# Patient Record
Sex: MALE | Race: WHITE | Employment: UNEMPLOYED | URBAN - METROPOLITAN AREA
[De-identification: names, ages, dates, MRNs, and addresses within clinical notes are randomized per-mention and may not be internally consistent; named-entity substitution may affect disease eponyms.]

---

## 2017-06-09 ENCOUNTER — TRANSFERRED RECORDS (OUTPATIENT)
Dept: HEALTH INFORMATION MANAGEMENT | Facility: CLINIC | Age: 37
End: 2017-06-09

## 2017-06-12 ENCOUNTER — TRANSFERRED RECORDS (OUTPATIENT)
Dept: HEALTH INFORMATION MANAGEMENT | Facility: CLINIC | Age: 37
End: 2017-06-12

## 2017-06-14 ENCOUNTER — TRANSFERRED RECORDS (OUTPATIENT)
Dept: HEALTH INFORMATION MANAGEMENT | Facility: CLINIC | Age: 37
End: 2017-06-14

## 2017-06-23 ENCOUNTER — OFFICE VISIT (OUTPATIENT)
Dept: OTOLARYNGOLOGY | Facility: CLINIC | Age: 37
End: 2017-06-23

## 2017-06-23 VITALS — WEIGHT: 183 LBS | HEIGHT: 73 IN | BODY MASS INDEX: 24.25 KG/M2

## 2017-06-23 DIAGNOSIS — R42 DIZZINESS: Primary | ICD-10-CM

## 2017-06-23 ASSESSMENT — PAIN SCALES - GENERAL: PAINLEVEL: MILD PAIN (2)

## 2017-06-23 NOTE — NURSING NOTE
Chief Complaint   Patient presents with     RECHECK     check left ear     Nandini Guzman Medical Assistant

## 2017-06-23 NOTE — LETTER
6/23/2017       RE: Baljeet Acosta  [address]  [place] [place] [address]     Dear Colleague,    Thank you for referring your patient, Baljeet Acosta, to the [facility]AT at [facility]. Please see a copy of my visit note below.    We had the pleasure of seeing Mr. Acosta at the [facility] for follow up regarding his dizziness.        HISTORY OF PRESENT ILLNESS:  Mr. Acosta is a 37-year-old gentleman with a history of left-sided Meniere's disease.  He has undergone multiple treatments including a left gentamicin injection as well endolymphatic sac decompression.  He also underwent a left vestibular nerve section here at the [facility] in 2010.  He had continued chronic dizziness and most recently was treated at [facility] in May and June of 2015 for a revision left vestibular section through middle fossa approach which was complicated by a CSF leak which was repaired shortly afterwards.  Since that most recent surgery the patient reports that he had resolution of his chronic dizziness and no further vertigo episodes.  However, more recently starting in May he began having to have new symptoms again.  The patient reports that he increased his activity level after his second vestibular nerve section.  He continued to have some occipital headaches which they initially thought was related to a spinal fluid leak and it resulted in two separate blood patches.  He continues to have this; however, this is not terribly bothersome to him.        Starting in early May, the patient reports having onset of a full vertigo attach including intense incapacitating vertigo with nausea and vomiting which lasted approximately four hours.  This resolved after sleeping, taking valium, Zofran and meclizine.  His balance function was then [id number] to normal.  He again had symptoms several weeks later while he was hiking.  He had  an odd sensation in his left ear associated with nausea and balance for approximately 30 minutes and then a week after that he had another episodes of intense vertigo that was associated with left aural fullness and a fluid sensation in that ear.  After these episodes he was seen by his primary care physician and was diagnosed with middle ear effusion on the left side and treated with amoxicillin.  In June he again had another intense vertigo attack with nausea and vomiting.  Now fluid and fullness sensation in both of his ears.  He was treated in the Emergency Department with meclizine, IV fluids and his symptoms resolved after five hours.  He again followed up with his primary care physician and was diagnosed bilateral middle ear effusion.  Due to the persistence in recurrence of his vertigo episodes he sought care and follow up in the [facility].  He had vestibular testing that was performed incidentally prior to his vestibular testing.  He had been taking his meclizine and valium at least within 12 hours of his testing.  His testing demonstrated no response on the ice calorics.  He did have normal function on the right hand side and the further findings were suggested of a left peripheral vestibular assistant hypofunction that appeared compensated functionally.  He also had VEMPS that were performed that were no response in the left ear and normal on the right.  He followed up with Dr. Doyle to [facility] and he had suggested that potentially he may have some Meniere's disease developing on the right hand side.  Also, recommended investigation into relating these episodes to panic attacks.         The patient has subsequently seen his psychologist and there is ruled out panic attach as a cause for some of his symptoms.  He again had another episode of vertigo only five days ago and has a constant dripping sensation in both of his ears; worse on the left compared to the right.  He denies any photophobia or  associated severe headaches.  He continues to have that occipital discomfort since his middle fossa approach from a year ago.  In between these episodes again he reports that his balances are relatively normal.  He feels fatigued, but otherwise has no difficulty with his other activities.  He has not having any issues with anxiety or having panic disorders more recently.        In regards to his function of hearing on the left hand side he does have a decline which has been present for quite some time; however, he can continue to use the telephone and understand speech perception on the left.  He is interested in discussing the results of his testing and investigating if he needs to have additional testing performed some time.       REVIEW OF SYSTEM: A 12-point review of systems was performed.   This positive includes unexplained fatigue, dizzy spells, headache, hearing loss, ear pain, tinnitus primarily in the left ear, sore joints, back pain and neck pain.        PHYSICAL EXAMINATION:  In general the patient is well appearing.  He is communicating appropriately.  His face is symmetric at rest with function.  He is a House-Brackmann I out of VI bilaterally.  His external ears are normal.  The external auditory canals are fine.  Tympanic membranes are Intact.  There is no retraction or middle ear effusion noted.  The middle ear appears well aerated bilaterally.  Oral cavity and oropharynx reveals no oral mucosal lesions.  Palate does have some post surgical changes from a prior uvulopalatoplasty.  His palate elevates symmetrically.  Tongue is midline and mobile.  His extraocular movements are intact.  He has gaze-evoked nystagmus.        AUDIOGRAM:  Audiogram was obtained at the [facility] which was reviewed.  This demonstrates normal pure tone thresholds on the right hand side and the left he has a moderate sensorineural hearing loss that appears relatively stable.  His word recognition score on the right is 100%  and on the left is 90%.  His pure tone average on the left is 50 dB and on the right is 8 dB.        IMPRESSION AND PLAN:  Mr. Acosta is a 37-year-old gentleman with a history of a left Meniere's disease.  He has had multiple procedures to help with his vertigo associated with the Meniere's including multiple gentamicin injections as well as revision vestibular nerve section.  He continues to have some new episodes of vertigo.  We discussed potential etiology for this; one could be residual function on the left hand side which may have been masked with some of his VNG findings due to his valium associated with the time of the testing.  We discussed repeating these tests which he was interesting with proceeding with this and in fact requested a repeat of his vestibular testing.  We will order him to have a VNG with ice calorics on the left hand side.  We then discussed the possibility of proceeding with a labyrinthectomy should he have some residual vestibular function on that left hand side.  He is not inclined to proceed with this nor are we recommending this procedure at all at this point.  If he does have somewhat functioning hearing per his report and it is not clear if he has any remaining function or if this is a new onset of right-sided Meniere's disease.  Reviewed with him this could be early signs of acting up on his right ear and we need to continue to monitor that to see what progresses from that standpoint.  He will have the vestibular testing done and we will contact him with the results of that.  All his questions were answered and he will contact us with any further concerns.        Eli Bello MD    Neurotology Fellow      EDWEY/carrie ADAME was present for the entire procedure between opening and closing.    aKmran Lau  Date of Service (when I saw the patient): (6/23/17)

## 2017-06-23 NOTE — PROGRESS NOTES
We had the pleasure of seeing Mr. Acosta at the [facility] for follow up regarding his dizziness.        HISTORY OF PRESENT ILLNESS:  Mr. Acosta is a 37-year-old gentleman with a history of left-sided Meniere's disease.  He has undergone multiple treatments including a left gentamicin injection as well endolymphatic sac decompression.  He also underwent a left vestibular nerve section here at the [facility] in 2010.  He had continued chronic dizziness and most recently was treated at [facility] in May and June of 2015 for a revision left vestibular section through middle fossa approach which was complicated by a CSF leak which was repaired shortly afterwards.  Since that most recent surgery the patient reports that he had resolution of his chronic dizziness and no further vertigo episodes.  However, more recently starting in May he began having to have new symptoms again.  The patient reports that he increased his activity level after his second vestibular nerve section.  He continued to have some occipital headaches which they initially thought was related to a spinal fluid leak and it resulted in two separate blood patches.  He continues to have this; however, this is not terribly bothersome to him.        Starting in early May, the patient reports having onset of a full vertigo attach including intense incapacitating vertigo with nausea and vomiting which lasted approximately four hours.  This resolved after sleeping, taking valium, Zofran and meclizine.  His balance function was then [id number] to normal.  He again had symptoms several weeks later while he was hiking.  He had an odd sensation in his left ear associated with nausea and balance for approximately 30 minutes and then a week after that he had another episodes of intense vertigo that was associated with left aural fullness and a fluid sensation in that ear.  After these episodes he was seen by his primary  care physician and was diagnosed with middle ear effusion on the left side and treated with amoxicillin.  In June he again had another intense vertigo attack with nausea and vomiting.  Now fluid and fullness sensation in both of his ears.  He was treated in the Emergency Department with meclizine, IV fluids and his symptoms resolved after five hours.  He again followed up with his primary care physician and was diagnosed bilateral middle ear effusion.  Due to the persistence in recurrence of his vertigo episodes he sought care and follow up in the [facility].  He had vestibular testing that was performed incidentally prior to his vestibular testing.  He had been taking his meclizine and valium at least within 12 hours of his testing.  His testing demonstrated no response on the ice calorics.  He did have normal function on the right hand side and the further findings were suggested of a left peripheral vestibular assistant hypofunction that appeared compensated functionally.  He also had VEMPS that were performed that were no response in the left ear and normal on the right.  He followed up with Dr. Doyle to [facility] and he had suggested that potentially he may have some Meniere's disease developing on the right hand side.  Also, recommended investigation into relating these episodes to panic attacks.         The patient has subsequently seen his psychologist and there is ruled out panic attach as a cause for some of his symptoms.  He again had another episode of vertigo only five days ago and has a constant dripping sensation in both of his ears; worse on the left compared to the right.  He denies any photophobia or associated severe headaches.  He continues to have that occipital discomfort since his middle fossa approach from a year ago.  In between these episodes again he reports that his balances are relatively normal.  He feels fatigued, but otherwise has no difficulty with his other activities.  He has  not having any issues with anxiety or having panic disorders more recently.        In regards to his function of hearing on the left hand side he does have a decline which has been present for quite some time; however, he can continue to use the telephone and understand speech perception on the left.  He is interested in discussing the results of his testing and investigating if he needs to have additional testing performed some time.       REVIEW OF SYSTEM: A 12-point review of systems was performed.   This positive includes unexplained fatigue, dizzy spells, headache, hearing loss, ear pain, tinnitus primarily in the left ear, sore joints, back pain and neck pain.        PHYSICAL EXAMINATION:  In general the patient is well appearing.  He is communicating appropriately.  His face is symmetric at rest with function.  He is a House-Brackmann I out of VI bilaterally.  His external ears are normal.  The external auditory canals are fine.  Tympanic membranes are Intact.  There is no retraction or middle ear effusion noted.  The middle ear appears well aerated bilaterally.  Oral cavity and oropharynx reveals no oral mucosal lesions.  Palate does have some post surgical changes from a prior uvulopalatoplasty.  His palate elevates symmetrically.  Tongue is midline and mobile.  His extraocular movements are intact.  He has gaze-evoked nystagmus.        AUDIOGRAM:  Audiogram was obtained at the [facility] which was reviewed.  This demonstrates normal pure tone thresholds on the right hand side and the left he has a moderate sensorineural hearing loss that appears relatively stable.  His word recognition score on the right is 100% and on the left is 90%.  His pure tone average on the left is 50 dB and on the right is 8 dB.        IMPRESSION AND PLAN:  Mr. Acosta is a 37-year-old gentleman with a history of a left Meniere's disease.  He has had multiple procedures to help with his vertigo associated with the Meniere's  including multiple gentamicin injections as well as revision vestibular nerve section.  He continues to have some new episodes of vertigo.  We discussed potential etiology for this; one could be residual function on the left hand side which may have been masked with some of his VNG findings due to his valium associated with the time of the testing.  We discussed repeating these tests which he was interesting with proceeding with this and in fact requested a repeat of his vestibular testing.  We will order him to have a VNG with ice calorics on the left hand side.  We then discussed the possibility of proceeding with a labyrinthectomy should he have some residual vestibular function on that left hand side.  He is not inclined to proceed with this nor are we recommending this procedure at all at this point.  If he does have somewhat functioning hearing per his report and it is not clear if he has any remaining function or if this is a new onset of right-sided Meniere's disease.  Reviewed with him this could be early signs of acting up on his right ear and we need to continue to monitor that to see what progresses from that standpoint.  He will have the vestibular testing done and we will contact him with the results of that.  All his questions were answered and he will contact us with any further concerns.        Eli Bello MD    Neurotology Fellow      DEWEY/carrie ADAME was present for the entire procedure between opening and closing.    Kamran Lau  Date of Service (when I saw the patient): (6/23/17)         Kamran ADAME MD, saw this patient with the resident and agree with the resident s findings and plan of care as documented in the resident s note. I personally reviewed the medications, labs and imaging.  Kamran ADAME, saw this patient with the resident/fellow and agree with the resident s findings and plan of care as documented in the resident s/fellow s note.

## 2017-06-23 NOTE — PATIENT INSTRUCTIONS
You will have VNG and Ice water Calorics- you will have a follow up appointment with Dr. Lau to review.   Please call our clinic for any questions,concerns,or worsening symptoms.      Clinic #[phone]       Option 3  for the triage nurse.

## 2017-06-23 NOTE — MR AVS SNAPSHOT
After Visit Summary   6/23/2017    Baljeet Acosta    MRN: [id number]           Patient Information     Date Of Birth          1980        Visit Information        Provider Department      6/23/2017 8:15 AM Kamran Lau MD [facility] Ear Nose and Throat        Today's Diagnoses     Dizziness    -  1      Care Instructions    You will have VNG and Ice water Calorics- you will have a follow up appointment with Dr. Lau to review.   Please call our clinic for any questions,concerns,or worsening symptoms.      Clinic #[phone]       Option 3  for the triage nurse.          Follow-ups after your visit        Additional Services     AUDIOLOGY BALANCE REFERRAL       Procedure(s): Video-Nystagmography with ice water calorics-- hx left nerve section                  Your next 10 appointments already scheduled     Jun 28, 2017  8:00 AM CDT   (Arrive by 7:45 AM)   Balance [facility] with [facility] ([facility] [place])    [address] [place] [address] [phone]           You are scheduled for the following tests: VNG (Videonystagmography). You will wear goggles with small cameras. These record small eye movements as you change position. This test takes 1 to 1 1/2 hours. Rotational Chair. You will sit a chair that has a motor. The room will be dark. You will wear goggles with a camera while the chair rocks slowly from side to side. This test takes 20 to 30 minutes. CDP (Computerized Dynamic Posturography). You will stand on a platform with your eyes open or closed. It will be unsteady at times. This will tell us how your balance systems work together and how well you sense the ground under your feet. This test takes 30 minutes.  Why am I having these tests? These are tests for your inner ear. They may help us find out why you feel dizzy or off balance.  How do I prepare? Below is a list of things that can affect test  results. Do NOT take these for 48 hours before your tests or we will need to reschedule. We want to make sure your test results are correct. Ask your doctor if you have questions about stopping any medicine.  48 hours before the tests: Stop drinking alcohol (beer, wine, liquor). Do NOT take Amitriptyline. Do NOT take medicines for: Allergy or colds. Examples: Benadryl (diphenhydramine), Allegra (fexofenadine), Zyrtec (cetirizine) and any over-the-counter medicine that may cause drowsiness. Anti-anxiety, unless you have been on them every day for the past 8 weeks or more. Examples: Xanax (alprazolam), Klonopin (clonazepam), Valium (diazepam), Ativan (lorazepam). Dizziness and nausea. Examples: Antivert/Bonine/Dramamine Less-Drowsy Formula (meclizine), Dramamine (dimenhydrinate), Trans-derm Scop (Scopolamine), Compazine (prochlorperazine), Phenergan (promethazine). Sleeping. This includes sleeping pills, sedatives, tranquilizers, muscle relaxants and narcotics. It is okay to take medicines for diabetes, heart, blood pressure, seizures, thyroid or an ongoing condition.  The day of the tests:   Please have a  with you.   Do not have caffeine (coffee, soda, chocolate, energy drinks).   Do not smoke or have nicotine for at least 4 hours before the tests. This includes tobacco, e-cigarettes and nicotine gum.   Do not eat 2 to 3 hours before the tests, unless you have diabetes. Then, you should follow your usual diet.   Do not wear any make-up or lotions around your eyes or eyelids.   If you wear contact lenses, be prepared to take them out for testing; or wear your glasses.   If you take the anti-nausea medicine Zofran (ondansetron), you may bring it with you to take after your tests.  Who should I call if I have questions? If you have any questions, please call the Balance Center at [facility]: [phone]            Jul 05, 2017  8:00 AM CDT   (Arrive by 7:45 AM)   Return Visit with Kamran SAMUELS  "MD Sid   [facility] Ear Nose and Throat ([facility] [place])    [address] [address] [phone]              Who to contact     Please call your clinic at [phone] to:    Ask questions about your health    Make or cancel appointments    Discuss your medicines    Learn about your test results    Speak to your doctor   If you have compliments or concerns about an experience at your clinic, or if you wish to file a complaint, please contact [facility] Patient Relations at [phone] or email us at [email or web address]         Additional Information About Your Visit        CelenoharFancy Information     [employer] gives you secure access to your electronic health record. If you see a primary care provider, you can also send messages to your care team and make appointments. If you have questions, please call your primary care clinic.  If you do not have a primary care provider, please call [phone] and they will assist you.      [employer] is an electronic gateway that provides easy, online access to your medical records. With [employer], you can request a clinic appointment, read your test results, renew a prescription or communicate with your care team.     To access your existing account, please contact your [facility] Clinic or call [phone] for assistance.        Care EveryWhere ID     This is your Care EveryWhere ID. This could be used by other organizations to access your [place] medical records  [id number]        Your Vitals Were     Height BMI (Body Mass Index)                1.854 m (6' 0.99\") 24.15 kg/m2           Blood Pressure from Last 3 Encounters:   06/30/15 134/70   05/20/15 108/72   03/16/15 122/86    Weight from Last 3 Encounters:   06/23/17 83 kg (183 lb)   06/30/15 83 kg (183 lb)   05/20/15 87.8 kg (193 lb 9.6 oz)              We Performed the Following     AUDIOLOGY BALANCE " REFERRAL        Primary Care Provider Office Phone # Fax #    Sanya Patel [phone]       [facility] [address] [facility] [address]  [facility] [address]        Equal Access to Services     SHITAL EARLY : Hadyuko cecilia steele chayao Soroseali, waaxda luqadaha, qaybta kaalmada adeegyada, linden bowens laSonicarlie aragon. So [facility] [phone].    ATENCIÓN: Si habla español, tiene a tapia disposición servicios gratuitos de asistencia lingüística. Llame al [phone].    We comply with applicable federal civil rights laws and [place] laws. We do not discriminate on the basis of race, color, national origin, age, disability sex, sexual orientation or gender identity.            Thank you!     Thank you for choosing [facility] EAR NOSE AND THROAT  for your care. Our goal is always to provide you with excellent care. Hearing back from our patients is one way we can continue to improve our services. Please take a few minutes to complete the written survey that you may receive in the mail after your visit with us. Thank you!             Your Updated Medication List - Protect others around you: Learn how to safely use, store and throw away your medicines at www.disposemymeds.org.          This list is accurate as of: 6/23/17  9:39 AM.  Always use your most recent med list.                   Brand Name Dispense Instructions for use Diagnosis    aluminum chloride 20 % external solution    DRYSOL    60 mL    Apply topically At Bedtime As sweating improves, decrease use to 1-2 times weekly.    Generalized hyperhidrosis       doxycycline 100 MG tablet    VIBRA-TABS    28 tablet    Take 1 tablet (100 mg) by mouth daily    Acne       IBUPROFEN PO           minocycline 100 MG capsule    MINOCIN/DYNACIN    180 capsule    Take 1 capsule (100 mg) by mouth 2 times daily    Acne vulgaris       ondansetron 4 MG ODT tab    ZOFRAN ODT    20 tablet    Take 1 tablet (4 mg) by mouth every 4 hours as needed for nausea     Meniere's disease, unspecified       RITALIN 10 MG tablet   Generic drug:  methylphenidate      Take 10 mg by mouth 4 times daily        SUMAtriptan 100 MG tablet    IMITREX    9 tablet    Take 1 tablet (100 mg) by mouth at onset of headache for migraine May repeat in 2 hrs if needed: max 2/day; avg 8 headaches monthly    Retro-orbital pain, right       SYNTHROID PO      Take 25 mcg by mouth every other day        VALIUM PO      Take 5 mg by mouth as needed for anxiety

## 2017-06-28 ENCOUNTER — OFFICE VISIT (OUTPATIENT)
Dept: AUDIOLOGY | Facility: CLINIC | Age: 37
End: 2017-06-28

## 2017-06-28 DIAGNOSIS — R42 DIZZINESS AND GIDDINESS: Primary | ICD-10-CM

## 2017-06-28 NOTE — MR AVS SNAPSHOT
After Visit Summary   6/28/2017    Baljeet Acosta    MRN: [id number]           Patient Information     Date Of Birth          1980        Visit Information        Provider Department      6/28/2017 8:00 AM Katiuska Anderson AuD M [facility] Audiology        Today's Diagnoses     Dizziness and giddiness    -  1       Follow-ups after your visit        Your next 10 appointments already scheduled     Jul 05, 2017  8:00 AM CDT   (Arrive by 7:45 AM)   Return Visit with MD WANDA Mayer [facility] Ear Nose and Throat ([facility] [place])    [address] [place] [address] [phone]              Who to contact     Please call your clinic at [phone] to:    Ask questions about your health    Make or cancel appointments    Discuss your medicines    Learn about your test results    Speak to your doctor   If you have compliments or concerns about an experience at your clinic, or if you wish to file a complaint, please contact [facility] Patient Relations at [phone] or email us at [email or web address]         Additional Information About Your Visit        MyChart Information     [employer] gives you secure access to your electronic health record. If you see a primary care provider, you can also send messages to your care team and make appointments. If you have questions, please call your primary care clinic.  If you do not have a primary care provider, please call [phone] and they will assist you.      [employer] is an electronic gateway that provides easy, online access to your medical records. With [employer], you can request a clinic appointment, read your test results, renew a prescription or communicate with your care team.     To access your existing account, please contact your [facility] or call [phone] for assistance.        Care EveryWhere ID     This is your Care EveryWhere  ID. This could be used by other organizations to access your [place] medical records  [id number]         Blood Pressure from Last 3 Encounters:   06/30/15 134/70   05/20/15 108/72   03/16/15 122/86    Weight from Last 3 Encounters:   06/23/17 83 kg (183 lb)   06/30/15 83 kg (183 lb)   05/20/15 87.8 kg (193 lb 9.6 oz)              We Performed the Following     Basic Vestibular Evaluation (00898)     Caloric Vestibular Test, W./ Rec, Bilateral, [place], 3 Irrigations (37612/52)     Tympanometry (impedance - testing) (60[address])        Primary Care Provider Office Phone # Fax #    Sanya Patel [phone]       [facility] [address]  [facility] [address]        Equal Access to Services     SHITAL EARLY : Hadii cecilia steele hadasho Soomaali, waaxda luqadaha, qaybta kaalmada adeegyada, linden means haycarlie davidson . So [facility] [phone].    ATENCIÓN: Si habla español, tiene a tapia disposición servicios gratuitos de asistencia lingüística. Llame al [phone].    We comply with applicable federal civil rights laws and [place] laws. We do not discriminate on the basis of race, color, national origin, age, disability sex, sexual orientation or gender identity.            Thank you!     Thank you for choosing [facility] AUDIOLOGY  for your care. Our goal is always to provide you with excellent care. Hearing back from our patients is one way we can continue to improve our services. Please take a few minutes to complete the written survey that you may receive in the mail after your visit with us. Thank you!             Your Updated Medication List - Protect others around you: Learn how to safely use, store and throw away your medicines at www.disposemymeds.org.          This list is accurate as of: 6/28/17 11:59 PM.  Always use your most recent med list.                   Brand Name Dispense Instructions for use Diagnosis    aluminum chloride 20 % external solution    DRYSOL     60 mL    Apply topically At Bedtime As sweating improves, decrease use to 1-2 times weekly.    Generalized hyperhidrosis       doxycycline 100 MG tablet    VIBRA-TABS    28 tablet    Take 1 tablet (100 mg) by mouth daily    Acne       IBUPROFEN PO           minocycline 100 MG capsule    MINOCIN/DYNACIN    180 capsule    Take 1 capsule (100 mg) by mouth 2 times daily    Acne vulgaris       ondansetron 4 MG ODT tab    ZOFRAN ODT    20 tablet    Take 1 tablet (4 mg) by mouth every 4 hours as needed for nausea    Meniere's disease, unspecified       RITALIN 10 MG tablet   Generic drug:  methylphenidate      Take 10 mg by mouth 4 times daily        SUMAtriptan 100 MG tablet    IMITREX    9 tablet    Take 1 tablet (100 mg) by mouth at onset of headache for migraine May repeat in 2 hrs if needed: max 2/day; avg 8 headaches monthly    Retro-orbital pain, right       SYNTHROID PO      Take 25 mcg by mouth every other day        VALIUM PO      Take 5 mg by mouth as needed for anxiety

## 2017-06-29 NOTE — PROGRESS NOTES
AUDIOLOGY REPORT    BACKGROUND INFORMATION: Baljeet Acosta, 37 year old, was seen in Audiology at the [facility] on 6/28/2017, for videonystagmography (VNG), referred by Dr. Kamran Lau. Darrel has Meniere's disease and a history of left ear hypofunction. He has had both a left vestibular nerve resection as well as a second one that was completed at the [facility] as he was still symptomatic. After the second surgery he had no dizziness and started to be quite active. Recently he has started to have episodic vertigo again, which he describes as the room spinning, accompanied by nausea and vomiting that can last a short time up to several hours. He takes meclizine zofran, and Valium, and it can help settle down his symptoms. Darrel reports 4 episodes in the last month. Hearing evaluations have revealed normal hearing in the right ear,and a moderate to moderately-severe sensorineural hearing loss in the left ear. The patient has not taken any antivestibular medications in the past 48 hours.    Darrel had this exam completed at the [facility] a few weeks ago and a complete left hypofunction was found. However, he had been taking his Valium. We will repeat it today without Valium to recheck if he does have any residual function in the left ear or not.     TEST RESULTS AND PROCEDURES:    Dizziness Handicap Inventory (DHI): 44/100 moderate perceived impairment      Videonystagmography (VNG) testing:  Tympanograms: normal eardrum mobility bilaterally  Ocular range of motion and ocular counter roll: Normal   Head Thrust: Positive with head going to the left  Gaze nystagmus with fixation: WNL   Saccades: WNL  Anti-saccades: Pt can complete task  Pursuit: WNL  Optokinetics: WNL  Gaze with fixation removed:     Center: 2 deg/sec right beating and upbeating    Right: 2 deg/sec right beating and upbeating    Left: 1 deg/sec right beating     Up: No nystagmus    Head Shake  test:     Horizontal:2 deg/sec right beating and upbeating    Vertical: 6 deg/sec upbeating      Hitterdal-Hallpike Head Right: Negative for nystagmus & symptoms   Oralia-Hallpike Head Left: Negative for nystagmus & symptoms   Roll Test: Negative for nystagmus & symptoms bilaterally    Positionals: Supine:4 deg/sec upbeating   Positionals: Body Right: 3 deg/sec upbeating  Positionals: Body Left: 3 deg/sec right beating and upbeating  Positionals: Pre-Caloric:2 deg/sec right beating and 3 deg/sec upbeating     Calorics (Tested at 44 degrees and 30 degrees Celsius for 30 seconds for warm and cool water, respectively):  Right Warm Eye Speed: 16 degrees per second right beating (when corrected for spontaneous nystagmus)  Left Warm Eye Speed: 0 degrees per second left beating (when corrected for spontaneous nystagmus)  Left Ice Eye Speed: 0 degrees per second right beating supine and sitting with head down position(when corrected for spontaneous nystagmus)  Difference between ear: 100% left hypofunction. (Greater than 25% considered clinically significant.)  Fixation Index: 0.18  Overall caloric test: abnormal    SUMMARY AND RECOMMENDATIONS:   1) Indications of central vestibular system involvement noted on today's exam were as follows:   -Upbeating nystagmus recorded through out testing. This has been recorded on previous tests as well.  -2-3 deg/sec right beating spontaneous nystagmus recorded through out testing.This has been recorded on previous tests as well.    2) Indications of peripheral vestibular system involvement noted on today's exam were as follows:   -Complete left hypofunction found via ice water caloric testing.No residual function is present. Note: Good function present in the right ear. No normative data exists for a change in caloric response intensity in one ear between test dates, therefore cannot determine if right ear function has decreased since last exam.                                                                                                                              Follow-up with Dr. Kamran Lau for medical management.  Please call this clinic at [phone] with questions regarding these results or recommendations.         Hitesh Wolf, [facility]  Licensed Audiologist  [place] #[id number]

## 2017-07-05 ENCOUNTER — OFFICE VISIT (OUTPATIENT)
Dept: OTOLARYNGOLOGY | Facility: CLINIC | Age: 37
End: 2017-07-05

## 2017-07-05 VITALS — BODY MASS INDEX: 24.25 KG/M2 | WEIGHT: 183 LBS | HEIGHT: 73 IN

## 2017-07-05 DIAGNOSIS — R42 VERTIGO: Primary | ICD-10-CM

## 2017-07-05 RX ORDER — TRIAMTERENE AND HYDROCHLOROTHIAZIDE 37.5; 25 MG/1; MG/1
1 CAPSULE ORAL DAILY
Qty: 30 CAPSULE | Refills: 11 | Status: SHIPPED | OUTPATIENT
Start: 2017-07-05 | End: 2017-08-04

## 2017-07-05 ASSESSMENT — PAIN SCALES - GENERAL: PAINLEVEL: MILD PAIN (2)

## 2017-07-05 NOTE — LETTER
7/5/2017       RE: Baljeet Acosta  [address]  [place] [address]     Dear Colleague,    Thank you for referring your patient, Baljeet Acosta, to the [facility] EAR NOSE AND THROAT at [facility]. Please see a copy of my visit note below.    This is a 37-year-old man who has a long history of Ménière's disease. He has had a number of treatments on the left side including sac decompression and a middle fossa [facility] he had had a suboccipital approach in June 2015 he developed a spinal fluid leak and since that time reports that he has had headache. The patient notes that his symptoms have gotten progressively worse over time in terms of his balance. The patient feels that he is still having problems with nausea and imbalance. Prior to June of this year he did not have the problem. The patient felt that for 2 years he did great but now has problems.    Vestibular testing: Today I reviewed the tests that he had here on June 28. The tests mainly show up beating nystagmus with vertical nystagmus reaching 6 /s there was no response to ice water caloric and the patient had otherwise a fairly normal test. The positional tests showed occasional right beating nystagmus consistent with an uncompensated left deficit.    Exam: Exam today shows that both tympanic membranes are anatomically intact. The left drum shows areas of monomer. The eyes show no evidence of nystagmus. The facial nerve remains a house Brackmann class I.    Impression: This appears most consistent with a central deficit that is uncompensated. The left nerve section appears to a been successful. The right ear may or may not have symptoms of Ménière's disease.    Plan: I talked with him today about the option of restarting a diuretic and following low-sodium diet. This seems to make most sense. He is a candidate for use of steroids and may either received transtympanic  steroids in the right ear or have a systemic steroid as well. Another alternative might be to consider a new scan. The abnormalities on central testing suggest a brain stem dysfunction and it's been several years since a new scan has been obtained. He is concerned that he has a spinal fluid leak in his left mastoid.    I will call him when the test results are available. He will contact us when he's ready to make a decision about steroids.    Again, thank you for allowing me to participate in the care of your patient.      Sincerely,    Kamran Lau MD

## 2017-07-05 NOTE — PROGRESS NOTES
This is a 37-year-old man who has a long history of Ménière's disease. He has had a number of treatments on the left side including sac decompression and a middle fossa vestibular nerve [facility] he had had a suboccipital approach in June 2015 he developed a spinal fluid leak and since that time reports that he has had headache. The patient notes that his symptoms have gotten progressively worse over time in terms of his balance. The patient feels that he is still having problems with nausea and imbalance. Prior to June of this year he did not have the problem. The patient felt that for 2 years he did great but now has problems.    Vestibular testing: Today I reviewed the tests that he had here on June 28. The tests mainly show up beating nystagmus with vertical nystagmus reaching 6 /s there was no response to ice water caloric and the patient had otherwise a fairly normal test. The positional tests showed occasional right beating nystagmus consistent with an uncompensated left deficit.    Exam: Exam today shows that both tympanic membranes are anatomically intact. The left drum shows areas of monomer. The eyes show no evidence of nystagmus. The facial nerve remains a house Brackmann class I.    Impression: This appears most consistent with a central deficit that is uncompensated. The left nerve section appears to a been successful. The right ear may or may not have symptoms of Ménière's disease.    Plan: I talked with him today about the option of restarting a diuretic and following low-sodium diet. This seems to make most sense. He is a candidate for use of steroids and may either received transtympanic steroids in the right ear or have a systemic steroid as well. Another alternative might be to consider a new scan. The abnormalities on central testing suggest a brain stem dysfunction and it's been several years since a new scan has been obtained. He is concerned that he has a spinal fluid leak in  his left mastoid.    I will call him when the test results are available. He will contact us when he's ready to make a decision about steroids.

## 2017-07-05 NOTE — MR AVS SNAPSHOT
After Visit Summary   7/5/2017    Baljeet Acosta    MRN: [id number]           Patient Information     Date Of Birth          1980        Visit Information        Provider Department      7/5/2017 8:00 AM Kamran Lau MD [facility] Ear Nose [facility] Throat        Today's Diagnoses     Vertigo    -  1      Care Instructions    You will have a MRI done - you will be called with the results.  Please  the prescription for dyazide at the pharmacy.   Please call our clinic for any questions,concerns,or worsening symptoms.      Clinic #[phone]       Option 3  for the triage nurse.          Follow-ups after your visit        Your next 10 appointments already scheduled     Jul 05, 2017  8:00 AM CDT   (Arrive by 7:45 AM)   Return Visit with MD WANDA Mayer [facility]ose [facility] Th[facility]at ([facility])    [address] [place] [address] [id number] [phone]            Jul 08, 2017  7:00 AM CDT   (Arrive by 6:45 AM)   MR BRAIN W/O & W CONTRAST with [id number]   [facility] MRI ([facility])    [address] [place] [address] [id number] [phone]           Take your medicines as usual, unless your doctor tells you not to. Bring a list of your current medicines to your exam (including vitamins, minerals and over-the-counter drugs).  You will be given intravenous contrast for this exam. To prepare:   The day before your exam, drink extra fluids at least six 8-ounce glasses (unless your doctor tells you to restrict your fluids).   Have a blood test (creatinine test) within 30 days of your exam. Go to your clinic or Diagnostic Imaging Department for this test.  The MRI machine uses a strong magnet. Please wear clothes without metal (snaps, zippers). A sweatsuit works well, or we may give you a hospital gown.  Please remove any body piercings and hair extensions before you arrive. You will also remove  watches, jewelry, hairpins, wallets, dentures, partial dental plates and hearing aids. You may wear contact lenses, and you may be able to wear your rings. We have a safe place to keep your personal items, but it is safer to leave them at home.   **IMPORTANT** THE INSTRUCTIONS BELOW ARE ONLY FOR THOSE PATIENTS WHO HAVE BEEN TOLD THEY WILL RECEIVE SEDATION OR GENERAL ANESTHESIA DURING THEIR MRI PROCEDURE:  IF YOU WILL RECEIVE SEDATION (take medicine to help you relax during your exam):   You must get the medicine from your doctor before you arrive. Bring the medicine to the exam. Do not take it at home.   Arrive one hour early. Bring someone who can take you home after the test. Your medicine will make you sleepy. After the exam, you may not drive, take a bus or take a taxi by yourself.   No eating 8 hours before your exam. You may have clear liquids up until 4 hours before your exam. (Clear liquids include water, clear tea, black coffee and fruit juice without pulp.)  IF YOU WILL RECEIVE ANESTHESIA (be asleep for your exam):   Arrive 1 1/2 hours early. Bring someone who can take you home after the test. You may not drive, take a bus or take a taxi by yourself.   No eating 8 hours before your exam. You may have clear liquids up until 4 hours before your exam. (Clear liquids include water, clear tea, black coffee and fruit juice without pulp.)  Please call the Imaging Department at your exam site with any questions.              Future tests that were ordered for you today     Open Future Orders        Priority Expected Expires Ordered    MRI Brain and Skull Base w & w/o contrast Routine  7/5/2018 7/5/2017            Who to contact     Please call your clinic at [phone] to:    Ask questions about your health    Make or cancel appointments    Discuss your medicines    Learn about your test results    Speak to your doctor   If you have compliments or concerns about an experience at your clinic, or if you wish to  "file a complaint, please contact [facility] Patient Relations at [phone] or email us at [email or web address]         Additional Information About Your Visit        [employer] Information     [employer] gives you secure access to your electronic health record. If you see a primary care provider, you can also send messages to your care team and make appointments. If you have questions, please call your primary care clinic.  If you do not have a primary care provider, please call [phone] and they will assist you.      [employer] is an electronic gateway that provides easy, online access to your medical records. With [employer], you can request a clinic appointment, read your test results, renew a prescription or communicate with your care team.     To access your existing account, please contact your [facility] or call [phone] for assistance.        Care EveryWhere ID     This is your Care EveryWhere ID. This could be used by other organizations to access your [place] medical records  [id number]        Your Vitals Were     Height BMI (Body Mass Index)                1.854 m (6' 0.99\") 24.15 kg/m2           Blood Pressure from Last 3 Encounters:   06/30/15 134/70   05/20/15 108/72   03/16/15 122/86    Weight from Last 3 Encounters:   07/05/17 83 kg (183 lb)   06/23/17 83 kg (183 lb)   06/30/15 83 kg (183 lb)                 Today's Medication Changes          These changes are accurate as of: 7/5/17  7:50 AM.  If you have any questions, ask your nurse or doctor.               Start taking these medicines.        Dose/Directions    triamterene-hydrochlorothiazide 37.5-25 MG per capsule   Commonly known as:  DYAZIDE   Used for:  Vertigo   Started by:  Kamran Lau MD        Dose:  1 capsule   Take 1 capsule by mouth daily   Quantity:  30 capsule   Refills:  11            Where to get your medicines      These medications were sent to [facility] [id number] [place] [address] [address] [place] [facility] [address] [facility]  [address], [facility] [address]     Phone:  [phone]     triamterene-hydrochlorothiazide 37.5-25 MG per capsule                Primary Care Provider Office Phone # Fax #    Sanya Patel [phone]       [facility] [address]  [facility] [address]        Equal Access to Services     SHITAL EARLY : Hadii aad ku hadasho Soomaali, waaxda luqadaha, qaybta kaalmada adeegyada, waxay idiin hayaan adeeg kharash ladianne . So [facility] [phone].    ATENCIÓN: Si habla español, tiene a tapia disposición servicios gratuitos de asistencia lingüística. [facility] [phone].    We comply with applicable federal civil rights laws and [place] laws. We do not discriminate on the basis of race, color, national origin, age, disability sex, sexual orientation or gender identity.            Thank you!     Thank you for choosing [facility] EAR NOSE [facility] TH[facility]AT  for your care. Our goal is always to provide you with excellent care. Hearing back from our patients is one way we can continue to improve our services. Please take a few minutes to complete the written survey that you may receive in the mail after your visit with us. Thank you!             Your Updated Medication List - Protect others around you: Learn how to safely use, store and throw away your medicines at www.disposemymeds.org.          This list is accurate as of: 7/5/17  7:50 AM.  Always use your most recent med list.                   Brand Name Dispense Instructions for use Diagnosis    aluminum chloride 20 % external solution    DRYSOL    60 mL    Apply topically At Bedtime As sweating improves, decrease use to 1-2 times weekly.    Generalized hyperhidrosis       doxycycline 100 MG tablet    VIBRA-TABS    28 tablet    Take 1 tablet (100 mg) by mouth daily    Acne       IBUPROFEN PO           minocycline 100 MG capsule     MINOCIN/DYNACIN    180 capsule    Take 1 capsule (100 mg) by mouth 2 times daily    Acne vulgaris       ondansetron 4 MG ODT tab    ZOFRAN ODT    20 tablet    Take 1 tablet (4 mg) by mouth every 4 hours as needed for nausea    Meniere's disease, unspecified       RITALIN 10 MG tablet   Generic drug:  methylphenidate      Take 10 mg by mouth 4 times daily        SUMAtriptan 100 MG tablet    IMITREX    9 tablet    Take 1 tablet (100 mg) by mouth at onset of headache for migraine May repeat in 2 hrs if needed: max 2/day; avg 8 headaches monthly    Retro-orbital pain, right       SYNTHROID PO      Take 25 mcg by mouth every other day        triamterene-hydrochlorothiazide 37.5-25 MG per capsule    DYAZIDE    30 capsule    Take 1 capsule by mouth daily    Vertigo       VALIUM PO      Take 5 mg by mouth as needed for anxiety

## 2017-07-05 NOTE — PATIENT INSTRUCTIONS
You will have a MRI done - you will be called with the results.  Please  the prescription for dyazide at the pharmacy.   Please call our clinic for any questions,concerns,or worsening symptoms.      Clinic #[phone]       Option 3  for the triage nurse.

## 2017-07-05 NOTE — NURSING NOTE
Chief Complaint   Patient presents with     RECHECK     vertigo       Nandini Guzman Medical Assistant

## 2017-07-12 ENCOUNTER — CARE COORDINATION (OUTPATIENT)
Dept: OTOLARYNGOLOGY | Facility: CLINIC | Age: 37
End: 2017-07-12

## 2017-07-12 NOTE — PROGRESS NOTES
Dr. Lau has reviewed the MRI- expected post op changes.  No CSF noted. Continue on the diuretic and rtc as scheduled  Above message left on pt voice Mail- call for any questions.  Diana Bailey RN  ENT Care Coordinator   Otology  [phone]

## 2017-10-10 DIAGNOSIS — R11.2 NAUSEA WITH VOMITING: Primary | ICD-10-CM

## 2017-10-10 DIAGNOSIS — H81.09 MENIERE DISEASE: ICD-10-CM

## 2017-10-10 RX ORDER — ONDANSETRON 4 MG/1
4 TABLET, ORALLY DISINTEGRATING ORAL EVERY 4 HOURS PRN
Qty: 2 TABLET | Refills: 0 | Status: SHIPPED | OUTPATIENT
Start: 2017-10-10 | End: 2017-10-11

## 2017-10-11 ENCOUNTER — CARE COORDINATION (OUTPATIENT)
Dept: OTOLARYNGOLOGY | Facility: CLINIC | Age: 37
End: 2017-10-11

## 2017-10-11 DIAGNOSIS — R11.2 NAUSEA WITH VOMITING: ICD-10-CM

## 2017-10-11 DIAGNOSIS — H81.09 MENIERE DISEASE: ICD-10-CM

## 2017-10-11 RX ORDER — ONDANSETRON 4 MG/1
4 TABLET, ORALLY DISINTEGRATING ORAL EVERY 6 HOURS PRN
Qty: 20 TABLET | Refills: 1 | Status: SHIPPED | OUTPATIENT
Start: 2017-10-11 | End: 2018-08-01

## 2018-08-01 DIAGNOSIS — R11.2 NAUSEA WITH VOMITING: ICD-10-CM

## 2018-08-01 DIAGNOSIS — H81.09 MENIERE DISEASE: ICD-10-CM

## 2018-08-01 RX ORDER — ONDANSETRON 4 MG/1
TABLET, ORALLY DISINTEGRATING ORAL
Qty: 20 TABLET | Refills: 0 | Status: SHIPPED | OUTPATIENT
Start: 2018-08-01 | End: 2018-11-27

## 2018-11-27 DIAGNOSIS — H81.09 MENIERE DISEASE: ICD-10-CM

## 2018-11-27 DIAGNOSIS — R11.2 NAUSEA WITH VOMITING: ICD-10-CM

## 2018-11-28 NOTE — TELEPHONE ENCOUNTER
ONDANSETRON ODT 4MG TABLETS       Last Written Prescription Date:  8/1/2018  Last Fill Quantity: 20,   # refills: 0  Last Office Visit : 7/5/2017  Future Office visit:  None    Routing refill request to provider for review/approval because: > 1 year, appt due.

## 2018-12-07 DIAGNOSIS — H81.09 MENIERE DISEASE: ICD-10-CM

## 2018-12-07 DIAGNOSIS — R11.2 NAUSEA WITH VOMITING: ICD-10-CM

## 2018-12-07 RX ORDER — ONDANSETRON 4 MG/1
TABLET, ORALLY DISINTEGRATING ORAL
Qty: 20 TABLET | Refills: 0 | Status: SHIPPED | OUTPATIENT
Start: 2018-12-07

## 2018-12-11 RX ORDER — ONDANSETRON 4 MG/1
TABLET, ORALLY DISINTEGRATING ORAL
Qty: 20 TABLET | Refills: 0 | OUTPATIENT
Start: 2018-12-11

## 2018-12-19 DIAGNOSIS — H81.09 MENIERE DISEASE: ICD-10-CM

## 2018-12-19 DIAGNOSIS — R11.2 NAUSEA WITH VOMITING: ICD-10-CM

## 2018-12-21 NOTE — TELEPHONE ENCOUNTER
zofran  Last Written Prescription Date:  12/7/18  Last Fill Quantity: 20   # refills: 0  Last Office Visit : 7/5/17  Future Office visit:  No future appt    Routing refill request to nurse for review/approval because:  Drug failed the [facility], [facility] or [facility] refill protocol

## 2019-01-02 RX ORDER — ONDANSETRON 4 MG/1
TABLET, ORALLY DISINTEGRATING ORAL
Qty: 30 TABLET | Refills: 0 | Status: SHIPPED | OUTPATIENT
Start: 2019-01-02

## 2019-11-07 ENCOUNTER — HEALTH MAINTENANCE LETTER (OUTPATIENT)
Age: 39
End: 2019-11-07

## 2020-02-17 ENCOUNTER — HEALTH MAINTENANCE LETTER (OUTPATIENT)
Age: 40
End: 2020-02-17

## 2020-11-29 ENCOUNTER — HEALTH MAINTENANCE LETTER (OUTPATIENT)
Age: 40
End: 2020-11-29

## 2021-04-10 ENCOUNTER — HEALTH MAINTENANCE LETTER (OUTPATIENT)
Age: 41
End: 2021-04-10

## 2021-09-25 ENCOUNTER — HEALTH MAINTENANCE LETTER (OUTPATIENT)
Age: 41
End: 2021-09-25

## 2022-05-07 ENCOUNTER — HEALTH MAINTENANCE LETTER (OUTPATIENT)
Age: 42
End: 2022-05-07

## 2022-12-26 ENCOUNTER — HEALTH MAINTENANCE LETTER (OUTPATIENT)
Age: 42
End: 2022-12-26

## 2023-06-02 ENCOUNTER — HEALTH MAINTENANCE LETTER (OUTPATIENT)
Age: 43
End: 2023-06-02